# Patient Record
Sex: FEMALE | Race: WHITE
[De-identification: names, ages, dates, MRNs, and addresses within clinical notes are randomized per-mention and may not be internally consistent; named-entity substitution may affect disease eponyms.]

---

## 2022-05-19 ENCOUNTER — HOSPITAL ENCOUNTER (INPATIENT)
Dept: HOSPITAL 12 - ER | Age: 82
LOS: 15 days | Discharge: NURSING, REHAB OR LONG-TERM CARE | DRG: 885 | End: 2022-06-03
Attending: INTERNAL MEDICINE
Payer: MEDICARE

## 2022-05-19 VITALS — HEIGHT: 63 IN | WEIGHT: 147 LBS | BODY MASS INDEX: 26.05 KG/M2

## 2022-05-19 VITALS — SYSTOLIC BLOOD PRESSURE: 132 MMHG | DIASTOLIC BLOOD PRESSURE: 72 MMHG

## 2022-05-19 VITALS — SYSTOLIC BLOOD PRESSURE: 129 MMHG | DIASTOLIC BLOOD PRESSURE: 54 MMHG

## 2022-05-19 VITALS — SYSTOLIC BLOOD PRESSURE: 122 MMHG | DIASTOLIC BLOOD PRESSURE: 59 MMHG

## 2022-05-19 DIAGNOSIS — F31.9: ICD-10-CM

## 2022-05-19 DIAGNOSIS — F03.91: ICD-10-CM

## 2022-05-19 DIAGNOSIS — F29: Primary | ICD-10-CM

## 2022-05-19 DIAGNOSIS — Z20.822: ICD-10-CM

## 2022-05-19 DIAGNOSIS — I10: ICD-10-CM

## 2022-05-19 DIAGNOSIS — D68.59: ICD-10-CM

## 2022-05-19 DIAGNOSIS — E78.5: ICD-10-CM

## 2022-05-19 DIAGNOSIS — G89.4: ICD-10-CM

## 2022-05-19 DIAGNOSIS — F20.9: ICD-10-CM

## 2022-05-19 DIAGNOSIS — E03.9: ICD-10-CM

## 2022-05-19 DIAGNOSIS — F39: ICD-10-CM

## 2022-05-19 DIAGNOSIS — M19.90: ICD-10-CM

## 2022-05-19 DIAGNOSIS — Z74.09: ICD-10-CM

## 2022-05-19 DIAGNOSIS — B96.89: ICD-10-CM

## 2022-05-19 DIAGNOSIS — N39.0: ICD-10-CM

## 2022-05-19 LAB
ALP SERPL-CCNC: 90 U/L (ref 50–136)
ALT SERPL W/O P-5'-P-CCNC: 29 U/L (ref 14–59)
AMPHETAMINES UR QL SCN>1000 NG/ML: NEGATIVE
APAP SERPL-MCNC: < 2 UG/ML (ref 10–30)
APPEARANCE UR: (no result)
AST SERPL-CCNC: 27 U/L (ref 15–37)
BILIRUB DIRECT SERPL-MCNC: 0.1 MG/DL (ref 0–0.2)
BILIRUB SERPL-MCNC: 0.3 MG/DL (ref 0.2–1)
BILIRUB UR QL STRIP: NEGATIVE
BUN SERPL-MCNC: 17 MG/DL (ref 7–18)
CHLORIDE SERPL-SCNC: 104 MMOL/L (ref 98–107)
CK SERPL-CCNC: 398 U/L (ref 26–192)
CO2 SERPL-SCNC: 28 MMOL/L (ref 21–32)
COCAINE UR QL SCN: NEGATIVE
COLOR UR: YELLOW
CREAT SERPL-MCNC: 0.7 MG/DL (ref 0.6–1.3)
DEPRECATED SQUAMOUS URNS QL MICRO: (no result) /HPF
ETHANOL SERPL-MCNC: < 3 MG/DL (ref 0–0)
GLUCOSE SERPL-MCNC: 98 MG/DL (ref 74–106)
GLUCOSE UR STRIP-MCNC: NEGATIVE MG/DL
HCT VFR BLD AUTO: 37.1 % (ref 31.2–41.9)
HGB UR QL STRIP: NEGATIVE
KETONES UR STRIP-MCNC: NEGATIVE MG/DL
LEUKOCYTE ESTERASE UR QL STRIP: (no result)
MCH RBC QN AUTO: 29.1 UUG (ref 24.7–32.8)
MCV RBC AUTO: 85.1 FL (ref 75.5–95.3)
NITRITE UR QL STRIP: NEGATIVE
OPIATES UR QL SCN: NEGATIVE
PCP UR QL SCN>25 NG/ML: NEGATIVE
PH UR STRIP: 6 [PH] (ref 5–8)
PLATELET # BLD AUTO: 186 K/UL (ref 179–408)
POTASSIUM SERPL-SCNC: 3.6 MMOL/L (ref 3.5–5.1)
RBC #/AREA URNS HPF: (no result) /HPF (ref 0–3)
SP GR UR STRIP: 1.02 (ref 1–1.03)
THC UR QL SCN>50 NG/ML: NEGATIVE
TSH SERPL DL<=0.005 MIU/L-ACNC: 8.37 MIU/ML (ref 0.36–3.74)
UROBILINOGEN UR STRIP-MCNC: 0.2 E.U./DL
WBC #/AREA URNS HPF: (no result) /HPF
WBC #/AREA URNS HPF: (no result) /HPF (ref 0–3)
WS STN SPEC: 7.1 G/DL (ref 6.4–8.2)
YEAST URNS QL MICRO: (no result) /HPF

## 2022-05-19 PROCEDURE — A4663 DIALYSIS BLOOD PRESSURE CUFF: HCPCS

## 2022-05-19 PROCEDURE — G0480 DRUG TEST DEF 1-7 CLASSES: HCPCS

## 2022-05-19 RX ADMIN — LORAZEPAM PRN MG: 1 TABLET ORAL at 20:37

## 2022-05-19 RX ADMIN — DIVALPROEX SODIUM SCH MG: 250 TABLET, DELAYED RELEASE ORAL at 20:36

## 2022-05-19 RX ADMIN — Medication SCH MG: at 17:32

## 2022-05-19 RX ADMIN — ACETAMINOPHEN PRN MG: 325 TABLET ORAL at 14:42

## 2022-05-19 RX ADMIN — Medication SCH ML: at 17:39

## 2022-05-19 NOTE — NUR
Gps/Lvn- Tries to call [facility]. couple of times, to obtain information about 
vaccines/immunizations ,  unable to get through,  message kept saying  try again at a later 
time. Called  Kirsty (daughter ) claimed  facility has the record.

## 2022-05-19 NOTE — NUR
Gps/Lvn- Assisted with pm shower ,  patient verbalized headache resolved. Redirectable , 
ambulates around , encouraged  to attend his group tx. Cooperative with staff, noted 
extremely [place] , does not have hearing aid now ,per daughter Kirsty she does not know what 
happened to it .

## 2022-05-19 NOTE — NUR
Received pt, sleeping on bed easily aroused by minimal stimuli. Patient well 
behaved at the moment, no untoward signs of behavior observed. Vitals stable. 
Waiting for MD to medically clear the pt.

## 2022-05-19 NOTE — NUR
ANY Initial Discharge Note:



Pt currently resides at [address] [place] [address] with a part-time 
caretaker. ANY will contact pt's daughter, Kirsty 

([phone]) to gather further information regarding the discharge plan. ANY will work with 
the pt, daughter and MD to ensure a safe and proper 

discharge plan.

## 2022-05-19 NOTE — NUR
Patient awake transferred to [facility] via stretcher with vitals taken as follows; 
BP-125/80 MI-70 RR-18 T-97.3 SPO2-98% RA PA-0/10.

## 2022-05-19 NOTE — NUR
Gps/Lvn- Was able to get hold of patient's daughter Kirsty ( [phone]), claimed she's 
worried about her mother's situation , verbalized she's being nervous of what is going on, 
wanting to talk to the Medical Doctor as well as Dr Carvalho, informed will notify Doctors  
and inform her request. Per Kirsty patient used to have apartment of her own, and had 
Caregiver part time , helps her with her groceries and Doctors appointment. Awol risk . Per 
Daughter she believes , patient received Moderna vaccine with booster but not sure exactly 
w/c date , informed will call SNF to double check the right date received.

## 2022-05-19 NOTE — NUR
Report from ER received by Nilay Clemons RN  from [facility] .. Admitted via wheel chair, in no 
sign of any distress, hesitancy answering questions, needed to repeat questions couple of 
time. Confused, disoriented, she thinks she's in someone else house, year 1910 or 1920 per 
patient, was able to answer [place] is the President.Patient was fed in ER prior to coming to 
M[facility]. Skin very dry, intact, tenting. Unable to provide more information asked, will refer to 
previous record from Facility . Does not want to be bothered, wants her overhead light be 
turned off. Routine  admission care .

## 2022-05-19 NOTE — NUR
Gps/Lvn- Stayed in th dinning room during dinner, interacting fairly well with her peers. 
Patient noted some word finding difficulty when conversing with staff , words does not makes 
sense , confused, disoriented, reminded she's in the Hospital. Safety reviewed

## 2022-05-20 VITALS — DIASTOLIC BLOOD PRESSURE: 73 MMHG | SYSTOLIC BLOOD PRESSURE: 160 MMHG

## 2022-05-20 VITALS — SYSTOLIC BLOOD PRESSURE: 121 MMHG | DIASTOLIC BLOOD PRESSURE: 69 MMHG

## 2022-05-20 RX ADMIN — POLYETHYLENE GLYCOL 3350 SCH GM: 17 POWDER, FOR SOLUTION ORAL at 09:00

## 2022-05-20 RX ADMIN — Medication SCH MG: at 08:58

## 2022-05-20 RX ADMIN — DIVALPROEX SODIUM SCH MG: 250 TABLET, DELAYED RELEASE ORAL at 20:12

## 2022-05-20 RX ADMIN — LORAZEPAM PRN MG: 1 TABLET ORAL at 15:20

## 2022-05-20 RX ADMIN — DIVALPROEX SODIUM SCH MG: 250 TABLET, DELAYED RELEASE ORAL at 08:55

## 2022-05-20 RX ADMIN — ESCITALOPRAM OXALATE SCH MG: 10 TABLET, FILM COATED ORAL at 08:57

## 2022-05-20 RX ADMIN — Medication SCH MG: at 17:43

## 2022-05-20 RX ADMIN — AMLODIPINE BESYLATE SCH MG: 5 TABLET ORAL at 09:00

## 2022-05-20 RX ADMIN — Medication SCH TAB: at 08:57

## 2022-05-20 RX ADMIN — Medication SCH ML: at 17:44

## 2022-05-20 RX ADMIN — LORAZEPAM PRN MG: 1 TABLET ORAL at 20:12

## 2022-05-20 RX ADMIN — Medication SCH MG: at 08:55

## 2022-05-20 RX ADMIN — Medication SCH ML: at 09:01

## 2022-05-20 NOTE — NUR
Tried to call [facility] but voice message, not accepting calls. Double 
checked number on intranet. Still unable to obtain Vaccination information at this time.

## 2022-05-20 NOTE — NUR
Firearms Report:



 completed and submitted a [employer] firearms report for 5150 for danger to others 
and grave disability certifications. A copy of report has been placed in patient chart.

## 2022-05-20 NOTE — NUR
Gps/Lvn- Kept up in her elsie-chair , kept trying to slide from elsie-chair, put patient in 
front of the Nurses Station for close supervision for safety

## 2022-05-20 NOTE — NUR
Gps/Lvn-  Salome was able to get hold of the [facility] , Mark , was able 
to give us Nurses Station information obtained patient received Moderna vaccine first does 
on 3/22/2021, second dose on 4/13/2021, booster [place] on 12/21/21 , no records of Pneumonia 
vaccine , nor Flu vaccine noted.
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
## 2022-05-21 VITALS — DIASTOLIC BLOOD PRESSURE: 75 MMHG | SYSTOLIC BLOOD PRESSURE: 145 MMHG

## 2022-05-21 VITALS — DIASTOLIC BLOOD PRESSURE: 68 MMHG | SYSTOLIC BLOOD PRESSURE: 149 MMHG

## 2022-05-21 VITALS — DIASTOLIC BLOOD PRESSURE: 70 MMHG | SYSTOLIC BLOOD PRESSURE: 147 MMHG

## 2022-05-21 RX ADMIN — Medication SCH MG: at 16:32

## 2022-05-21 RX ADMIN — Medication SCH MG: at 08:21

## 2022-05-21 RX ADMIN — LORAZEPAM PRN MG: 1 TABLET ORAL at 21:17

## 2022-05-21 RX ADMIN — Medication SCH ML: at 16:46

## 2022-05-21 RX ADMIN — Medication SCH TAB: at 08:21

## 2022-05-21 RX ADMIN — POLYETHYLENE GLYCOL 3350 SCH GM: 17 POWDER, FOR SOLUTION ORAL at 08:21

## 2022-05-21 RX ADMIN — ESCITALOPRAM OXALATE SCH MG: 10 TABLET, FILM COATED ORAL at 08:12

## 2022-05-21 RX ADMIN — DIVALPROEX SODIUM SCH MG: 250 TABLET, DELAYED RELEASE ORAL at 21:17

## 2022-05-21 RX ADMIN — NAPROXEN PRN MG: 500 TABLET ORAL at 21:17

## 2022-05-21 RX ADMIN — Medication SCH MG: at 08:12

## 2022-05-21 RX ADMIN — LORAZEPAM PRN MG: 1 TABLET ORAL at 12:43

## 2022-05-21 RX ADMIN — DIVALPROEX SODIUM SCH MG: 250 TABLET, DELAYED RELEASE ORAL at 08:11

## 2022-05-21 RX ADMIN — AMLODIPINE BESYLATE SCH MG: 5 TABLET ORAL at 08:12

## 2022-05-21 RX ADMIN — Medication SCH ML: at 09:00

## 2022-05-21 NOTE — NUR
GPS: 14 DAY HOLD 6329 FAXED TO THE [facility] FOR GROUNDS OF DTO AND GD. PT GIVEN A COPY OF THE 
HOLD.

## 2022-05-21 NOTE — NUR
Gps/Lvn- Kept wandering around , going room ro room, needing constant redirections, pushing 
doors, AWOL risk , continue to monitor safety, needs. Speech incoherent, confused., [place]

## 2022-05-22 VITALS — DIASTOLIC BLOOD PRESSURE: 64 MMHG | SYSTOLIC BLOOD PRESSURE: 134 MMHG

## 2022-05-22 VITALS — DIASTOLIC BLOOD PRESSURE: 58 MMHG | SYSTOLIC BLOOD PRESSURE: 114 MMHG

## 2022-05-22 VITALS — DIASTOLIC BLOOD PRESSURE: 67 MMHG | SYSTOLIC BLOOD PRESSURE: 146 MMHG

## 2022-05-22 RX ADMIN — Medication SCH TAB: at 09:01

## 2022-05-22 RX ADMIN — POLYETHYLENE GLYCOL 3350 SCH GM: 17 POWDER, FOR SOLUTION ORAL at 09:02

## 2022-05-22 RX ADMIN — Medication SCH MG: at 09:03

## 2022-05-22 RX ADMIN — AMLODIPINE BESYLATE SCH MG: 5 TABLET ORAL at 09:02

## 2022-05-22 RX ADMIN — Medication SCH ML: at 16:30

## 2022-05-22 RX ADMIN — LORAZEPAM PRN MG: 1 TABLET ORAL at 20:44

## 2022-05-22 RX ADMIN — ESCITALOPRAM OXALATE SCH MG: 10 TABLET, FILM COATED ORAL at 09:00

## 2022-05-22 RX ADMIN — DIVALPROEX SODIUM SCH MG: 250 TABLET, DELAYED RELEASE ORAL at 20:44

## 2022-05-22 RX ADMIN — DIVALPROEX SODIUM SCH MG: 250 TABLET, DELAYED RELEASE ORAL at 09:00

## 2022-05-22 RX ADMIN — Medication SCH ML: at 09:03

## 2022-05-22 RX ADMIN — Medication SCH MG: at 16:26

## 2022-05-22 NOTE — NUR
Patient was found in another patients room at the start of the shift. Calm but confused. 
Easily redirectable. This writer was unable to engage in any meaningful conversation d/t 
patient being delusional and paranoid. Safety Stratiges in place. Continuing to reorient and 
redirect patient as needed. No aggression noted this shift. Patient is medication compliant.

## 2022-05-22 NOTE — NUR
Gps/Lvn- Remains figity, kept fixing her bed, no hallucinations noted, noted kept pushing , 
checking doors doors . Confused, disoriented, incoherent speech , constantly needing 
redirections .

## 2022-05-23 VITALS — SYSTOLIC BLOOD PRESSURE: 118 MMHG | DIASTOLIC BLOOD PRESSURE: 64 MMHG

## 2022-05-23 VITALS — DIASTOLIC BLOOD PRESSURE: 62 MMHG | SYSTOLIC BLOOD PRESSURE: 114 MMHG

## 2022-05-23 VITALS — DIASTOLIC BLOOD PRESSURE: 69 MMHG | SYSTOLIC BLOOD PRESSURE: 128 MMHG

## 2022-05-23 RX ADMIN — Medication SCH MG: at 08:35

## 2022-05-23 RX ADMIN — Medication SCH ML: at 17:36

## 2022-05-23 RX ADMIN — AMLODIPINE BESYLATE SCH MG: 5 TABLET ORAL at 08:36

## 2022-05-23 RX ADMIN — DIVALPROEX SODIUM SCH MG: 250 TABLET, DELAYED RELEASE ORAL at 20:49

## 2022-05-23 RX ADMIN — Medication SCH ML: at 08:38

## 2022-05-23 RX ADMIN — ESCITALOPRAM OXALATE SCH MG: 10 TABLET, FILM COATED ORAL at 08:36

## 2022-05-23 RX ADMIN — ACETAMINOPHEN PRN MG: 325 TABLET ORAL at 12:08

## 2022-05-23 RX ADMIN — Medication SCH TAB: at 08:36

## 2022-05-23 RX ADMIN — POLYETHYLENE GLYCOL 3350 SCH GM: 17 POWDER, FOR SOLUTION ORAL at 08:37

## 2022-05-23 RX ADMIN — Medication SCH MG: at 17:35

## 2022-05-23 RX ADMIN — LORAZEPAM PRN MG: 1 TABLET ORAL at 22:55

## 2022-05-23 RX ADMIN — LORAZEPAM PRN MG: 1 TABLET ORAL at 12:08

## 2022-05-23 RX ADMIN — DIVALPROEX SODIUM SCH MG: 250 TABLET, DELAYED RELEASE ORAL at 08:35

## 2022-05-23 NOTE — NUR
Patient is confused and is unable to respond to redirection for longer than a minute. Safety 
Stratiges are in place. Fluids encouraged for UTI , and patient is medication compliant. 
Some urinary incontinence noted during the night. Patient slept for 7.00 hours.

## 2022-05-23 NOTE — NUR
received patient  incoherent speech, confused, disoriented, constantly needing to be 
redirected, tends to wander room to room, Fluids offered for UTI, PRN Ativan given as 
ordered,will continue close monitoring.

## 2022-05-24 VITALS — SYSTOLIC BLOOD PRESSURE: 121 MMHG | DIASTOLIC BLOOD PRESSURE: 69 MMHG

## 2022-05-24 VITALS — SYSTOLIC BLOOD PRESSURE: 166 MMHG | DIASTOLIC BLOOD PRESSURE: 80 MMHG

## 2022-05-24 VITALS — SYSTOLIC BLOOD PRESSURE: 115 MMHG | DIASTOLIC BLOOD PRESSURE: 64 MMHG

## 2022-05-24 RX ADMIN — DIVALPROEX SODIUM SCH MG: 250 TABLET, DELAYED RELEASE ORAL at 08:33

## 2022-05-24 RX ADMIN — Medication SCH TAB: at 08:33

## 2022-05-24 RX ADMIN — Medication SCH MG: at 16:59

## 2022-05-24 RX ADMIN — Medication SCH ML: at 17:00

## 2022-05-24 RX ADMIN — DIVALPROEX SODIUM SCH MG: 250 TABLET, DELAYED RELEASE ORAL at 20:10

## 2022-05-24 RX ADMIN — LORAZEPAM PRN MG: 1 TABLET ORAL at 21:17

## 2022-05-24 RX ADMIN — Medication SCH MG: at 08:40

## 2022-05-24 RX ADMIN — AMLODIPINE BESYLATE SCH MG: 5 TABLET ORAL at 08:34

## 2022-05-24 RX ADMIN — Medication SCH MG: at 08:33

## 2022-05-24 RX ADMIN — POLYETHYLENE GLYCOL 3350 SCH GM: 17 POWDER, FOR SOLUTION ORAL at 08:34

## 2022-05-24 RX ADMIN — Medication PRN MG: at 00:07

## 2022-05-24 RX ADMIN — Medication PRN MG: at 23:06

## 2022-05-24 RX ADMIN — Medication SCH ML: at 08:39

## 2022-05-24 NOTE — NUR
Received patient in the hallway. she is noted A/O x 1 (to name only) she is noted pacing the 
hallway, going into other patient's rooms, she is forgetful and hard to redirect. she is 
unable to have a meaningful conversation with this writer. impaired insight and judgment is 
noted as to the reason for her admission to [facility]. Ativan 1mg PO PRN was given for anxiety and 
agitation.  V/S are stable. patient in no distress. PO fluid and snacks were provided. 
Safety and fall precaution are in place. will continue to monitor.

## 2022-05-25 VITALS — SYSTOLIC BLOOD PRESSURE: 157 MMHG | DIASTOLIC BLOOD PRESSURE: 71 MMHG

## 2022-05-25 VITALS — DIASTOLIC BLOOD PRESSURE: 93 MMHG | SYSTOLIC BLOOD PRESSURE: 173 MMHG

## 2022-05-25 VITALS — DIASTOLIC BLOOD PRESSURE: 85 MMHG | SYSTOLIC BLOOD PRESSURE: 109 MMHG

## 2022-05-25 LAB
BUN SERPL-MCNC: 24 MG/DL (ref 7–18)
CHLORIDE SERPL-SCNC: 108 MMOL/L (ref 98–107)
CHOLEST SERPL-MCNC: 152 MG/DL (ref ?–200)
CO2 SERPL-SCNC: 30 MMOL/L (ref 21–32)
CREAT SERPL-MCNC: 0.8 MG/DL (ref 0.6–1.3)
GLUCOSE SERPL-MCNC: 102 MG/DL (ref 74–106)
HCT VFR BLD AUTO: 34.5 % (ref 31.2–41.9)
HDLC SERPL-MCNC: 55 MG/DL (ref 40–60)
MAGNESIUM SERPL-MCNC: 2.1 MG/DL (ref 1.8–2.4)
MCH RBC QN AUTO: 29.5 UUG (ref 24.7–32.8)
MCV RBC AUTO: 86.5 FL (ref 75.5–95.3)
NEUTS SEG NFR BLD MANUAL: 0 % (ref 42–75)
PHOSPHATE SERPL-MCNC: 4.1 MG/DL (ref 2.5–4.9)
PLATELET # BLD AUTO: 148 K/UL (ref 179–408)
POTASSIUM SERPL-SCNC: 3.9 MMOL/L (ref 3.5–5.1)
TRIGL SERPL-MCNC: 63 MG/DL (ref 30–150)

## 2022-05-25 RX ADMIN — Medication SCH MG: at 09:12

## 2022-05-25 RX ADMIN — DIVALPROEX SODIUM SCH MG: 250 TABLET, DELAYED RELEASE ORAL at 12:59

## 2022-05-25 RX ADMIN — DIVALPROEX SODIUM SCH MG: 250 TABLET, DELAYED RELEASE ORAL at 09:12

## 2022-05-25 RX ADMIN — Medication SCH ML: at 18:02

## 2022-05-25 RX ADMIN — DIVALPROEX SODIUM SCH MG: 250 TABLET, DELAYED RELEASE ORAL at 18:02

## 2022-05-25 RX ADMIN — LEVOTHYROXINE SODIUM SCH MCG: 25 TABLET ORAL at 06:52

## 2022-05-25 RX ADMIN — Medication PRN MG: at 22:54

## 2022-05-25 RX ADMIN — DIVALPROEX SODIUM SCH MG: 250 TABLET, DELAYED RELEASE ORAL at 12:01

## 2022-05-25 RX ADMIN — Medication SCH ML: at 12:02

## 2022-05-25 RX ADMIN — AMLODIPINE BESYLATE SCH MG: 5 TABLET ORAL at 09:11

## 2022-05-25 RX ADMIN — Medication SCH MG: at 09:11

## 2022-05-25 RX ADMIN — POLYETHYLENE GLYCOL 3350 SCH GM: 17 POWDER, FOR SOLUTION ORAL at 09:18

## 2022-05-25 RX ADMIN — LORAZEPAM PRN MG: 1 TABLET ORAL at 18:04

## 2022-05-25 RX ADMIN — Medication SCH MG: at 18:04

## 2022-05-25 RX ADMIN — Medication SCH TAB: at 09:12

## 2022-05-25 NOTE — NUR
GPS: PT RECEIVED ON BED TODAY. DENIES ANY PAIN OR DISCOMFORT. PT SEEN WANDERING PACING 
HALLWAY AND VERY CONFUSED, REORIENTED PT TO HER ROOM. REFUSED WEARING DIAPER AND VERY 
RESISTIVE WITH CARE, PT WALKING AT HALLWAY WITH LOOSE BOWEL MOVEMENT. ASSISTED PT TO THE 
BATHROOM. PT UNABLE TO MAKE NEEDS KNOWN. STAFF ABLE TO PUT DIAPER AND PANTS ON PT. COMPLIANT 
WITH MEDS. NO AGITATION NOTED.

## 2022-05-25 NOTE — NUR
GPS: PT HAD BEEN PACING ALONG THE HALLWAY AND ALWAYS STANDING BY ON THE ENTRANCE DOOR. PT IS 
ON AWOL RISK. REDIRECTING PT BUT SOMEWHAT DIFFICULT. PT WANDERS ON THE ROOM. RE-ORIENTED PT 
TO HER ROOM BUT SEEMS TO FORGET IT EASILY. PT FEELS ANXIOUS AND STATING WANTING TO LEAVE THE 
PLACE, ASKING STAFF TO OPEN THE FRONT DOOR FOR HER. INTRUSIVE WITH STAFF. HAVE FLIGHT OF 
IDEAS, JUMPING FROM ONE SUBJECT TO ANOTHER. PT WAS GIVEN ATIVAN FOR ANXIETY. NO AGITATION 
NOTED AT THIS TIME. WILL MONITOR FOR SAFETY.

## 2022-05-26 VITALS — DIASTOLIC BLOOD PRESSURE: 58 MMHG | SYSTOLIC BLOOD PRESSURE: 124 MMHG

## 2022-05-26 VITALS — SYSTOLIC BLOOD PRESSURE: 149 MMHG | DIASTOLIC BLOOD PRESSURE: 75 MMHG

## 2022-05-26 VITALS — DIASTOLIC BLOOD PRESSURE: 76 MMHG | SYSTOLIC BLOOD PRESSURE: 145 MMHG

## 2022-05-26 RX ADMIN — Medication SCH ML: at 09:00

## 2022-05-26 RX ADMIN — DIVALPROEX SODIUM SCH MG: 250 TABLET, DELAYED RELEASE ORAL at 10:15

## 2022-05-26 RX ADMIN — Medication SCH TAB: at 10:18

## 2022-05-26 RX ADMIN — Medication SCH ML: at 17:04

## 2022-05-26 RX ADMIN — AMLODIPINE BESYLATE SCH MG: 5 TABLET ORAL at 10:14

## 2022-05-26 RX ADMIN — LEVOTHYROXINE SODIUM SCH MCG: 25 TABLET ORAL at 06:47

## 2022-05-26 RX ADMIN — Medication PRN MG: at 21:48

## 2022-05-26 RX ADMIN — POLYETHYLENE GLYCOL 3350 SCH GM: 17 POWDER, FOR SOLUTION ORAL at 09:00

## 2022-05-26 RX ADMIN — NAPROXEN PRN MG: 500 TABLET ORAL at 21:18

## 2022-05-26 RX ADMIN — Medication SCH MG: at 10:13

## 2022-05-26 RX ADMIN — LORAZEPAM PRN MG: 1 TABLET ORAL at 20:33

## 2022-05-26 RX ADMIN — DIVALPROEX SODIUM SCH MG: 250 TABLET, DELAYED RELEASE ORAL at 16:55

## 2022-05-26 RX ADMIN — DIVALPROEX SODIUM SCH MG: 250 TABLET, DELAYED RELEASE ORAL at 12:35

## 2022-05-26 NOTE — NUR
Gps/Donovann- Amira Zapata NP was called,  to informed patient had a fall this am. , texted, left 
message.

## 2022-05-26 NOTE — NUR
Gps/Lvn- Apparently per monitor ,  as reviewed   patient dint have any falls this am. 
Patient tends to sit on the floor, leans on the walls.  kept patient infront of the Nurses 
station during  lunch

## 2022-05-26 NOTE — NUR
Gps/Lvn- Found patient on the floor, in front  of the Social Workers's  office , lying on 
her left side. Patient does not know what happened, confused, disoriented, , incoherent 
speech. Denies  any pain, no discomfort at this time. B/P 126/54- HR 78, resp. 18, 02 sat 
95% . Patient was assisted to elsie-chair, kept up by the Nurses Station , monitored closely 
for safety .

## 2022-05-27 VITALS — SYSTOLIC BLOOD PRESSURE: 154 MMHG | DIASTOLIC BLOOD PRESSURE: 73 MMHG

## 2022-05-27 VITALS — DIASTOLIC BLOOD PRESSURE: 76 MMHG | SYSTOLIC BLOOD PRESSURE: 131 MMHG

## 2022-05-27 RX ADMIN — Medication SCH MG: at 08:26

## 2022-05-27 RX ADMIN — Medication SCH ML: at 08:34

## 2022-05-27 RX ADMIN — DIVALPROEX SODIUM SCH MG: 250 TABLET, DELAYED RELEASE ORAL at 13:18

## 2022-05-27 RX ADMIN — Medication SCH TAB: at 08:26

## 2022-05-27 RX ADMIN — POLYETHYLENE GLYCOL 3350 SCH GM: 17 POWDER, FOR SOLUTION ORAL at 08:28

## 2022-05-27 RX ADMIN — DIVALPROEX SODIUM SCH MG: 250 TABLET, DELAYED RELEASE ORAL at 08:26

## 2022-05-27 RX ADMIN — AMLODIPINE BESYLATE SCH MG: 5 TABLET ORAL at 08:26

## 2022-05-27 RX ADMIN — LORAZEPAM PRN MG: 1 TABLET ORAL at 20:22

## 2022-05-27 RX ADMIN — Medication SCH ML: at 16:56

## 2022-05-27 RX ADMIN — DIVALPROEX SODIUM SCH MG: 250 TABLET, DELAYED RELEASE ORAL at 16:56

## 2022-05-27 RX ADMIN — Medication PRN MG: at 22:31

## 2022-05-27 RX ADMIN — LEVOTHYROXINE SODIUM SCH MCG: 25 TABLET ORAL at 06:09

## 2022-05-27 NOTE — NUR
RECEIVED PATIENT IN THE HALLWAY SITTING IN A JOSE MANUEL CHAIR FOR SAFETY. SHE IS NOTED A/O X 1. 
HYPERVERBAL, RESTLESS, AND ANXIOUS. PATIENT IS UNABLE TO HAVE A MEANINGFUL CONVERSATION WITH 
THIS WRITER. SHE NEEDS CONSTANT REDIRECTION AND REALITY ORIENTATION. NO AGGRESSIVE OR 
COMBATIVE BX NOTED AT THIS TIME. ATIVAN 1MG PO PRN WAS GIVEN. FOR ANXIETY. V/S STABLE. PT IN 
NO DISTRESS. SHE WAS GIVEN PO FLUIDS AND SNACKS. SAFETY AND FALL PRECAUTION ARE IN PLACE. 
SHE IS REASSURED FOR HER SAFETY. WILL CONTINUE TO MONITOR Q15 MIN CHECKS AS PER UNIT 
PROTOCOL.

## 2022-05-27 NOTE — NUR
Received patient in the Karyn chair at the start of the shift. Agitated and confused. The 
patient was also anxious. This writer reoriented and reassured the patient many times, gave 
patient medication for anxiety with some snack and helped her with a shower. Ambulated 
patient up and down the pugh a few times, using a front wheel walker, then the patient went 
to bed. Bed alarm is on and frequent rounding done to ensure safety. No changes noted.

## 2022-05-27 NOTE — NUR
Gps/Lvn-Bed alarm on when pt. in bed, noted gait slightly unsteady, tends to lean on the 
walls when walking , Potential for fall. Remains confused, speech  incoherent.No aggressive 
behavior needed assist in toileting , diaper on . Complaint with her routine meds.  Fluid 
intake adequate

## 2022-05-28 VITALS — DIASTOLIC BLOOD PRESSURE: 73 MMHG | SYSTOLIC BLOOD PRESSURE: 141 MMHG

## 2022-05-28 VITALS — SYSTOLIC BLOOD PRESSURE: 147 MMHG | DIASTOLIC BLOOD PRESSURE: 72 MMHG

## 2022-05-28 VITALS — DIASTOLIC BLOOD PRESSURE: 71 MMHG | SYSTOLIC BLOOD PRESSURE: 134 MMHG

## 2022-05-28 RX ADMIN — Medication SCH ML: at 09:09

## 2022-05-28 RX ADMIN — Medication SCH TAB: at 09:05

## 2022-05-28 RX ADMIN — Medication SCH ML: at 16:45

## 2022-05-28 RX ADMIN — POLYETHYLENE GLYCOL 3350 SCH GM: 17 POWDER, FOR SOLUTION ORAL at 09:08

## 2022-05-28 RX ADMIN — DIVALPROEX SODIUM SCH MG: 250 TABLET, DELAYED RELEASE ORAL at 16:45

## 2022-05-28 RX ADMIN — DIVALPROEX SODIUM SCH MG: 250 TABLET, DELAYED RELEASE ORAL at 09:05

## 2022-05-28 RX ADMIN — NAPROXEN PRN MG: 500 TABLET ORAL at 16:45

## 2022-05-28 RX ADMIN — ACETAMINOPHEN PRN MG: 325 TABLET ORAL at 12:47

## 2022-05-28 RX ADMIN — Medication SCH MG: at 09:05

## 2022-05-28 RX ADMIN — AMLODIPINE BESYLATE SCH MG: 5 TABLET ORAL at 09:08

## 2022-05-28 RX ADMIN — DIVALPROEX SODIUM SCH MG: 250 TABLET, DELAYED RELEASE ORAL at 12:47

## 2022-05-28 RX ADMIN — LEVOTHYROXINE SODIUM SCH MCG: 25 TABLET ORAL at 06:24

## 2022-05-28 RX ADMIN — Medication PRN MG: at 21:21

## 2022-05-28 NOTE — NUR
RECEIVED PATIENT IN THE HALLWAY SITTING IN A JOSE MANUEL CHAIR FOR SAFETY. SHE IS NOTED A/O X 1 
WITH NO CHANGES IN LOC. SHE IS HYPERVERBAL. SHE IS UNABLE TO HAVE A MEANINGFUL CONVERSATION 
WITH THIS WRITER. SHE NEEDS CONSTANT REDIRECTION AND REALITY ORIENTATION. SHE NEEDS 
ASSISTANCE WITH ADLS. NO AGGRESSIVE OR COMBATIVE BX NOTED AT THIS TIME. V/S STABLE. PT IN NO 
DISTRESS. SHE WAS GIVEN PO FLUIDS AND SNACKS. SAFETY AND FALL PRECAUTION ARE IN PLACE. SHE 
IS REASSURED FOR HER SAFETY. WILL CONTINUE TO MONITOR Q15 MIN CHECKS AS PER UNIT PROTOCOL.

## 2022-05-28 NOTE — NUR
GPS: Nursing Notes: Thought Disorder:

Patient is awake and responding to her name, disoriented, confused, disorganized, impaired 
judgment, poor insight, believes that his dad is here, believes that something happened to 
him, redirected and reoriented during shift, unable to formulate a viable plan for self 
care, unkempt appearance, unkempt appearance, AWOL risk, episodes of asking to go home, 
unsteady gait, fall risk, disorganized, resistant with nursing care, continue with treatment 
plan.

## 2022-05-29 VITALS — SYSTOLIC BLOOD PRESSURE: 151 MMHG | DIASTOLIC BLOOD PRESSURE: 67 MMHG

## 2022-05-29 VITALS — SYSTOLIC BLOOD PRESSURE: 160 MMHG | DIASTOLIC BLOOD PRESSURE: 73 MMHG

## 2022-05-29 RX ADMIN — DIVALPROEX SODIUM SCH MG: 250 TABLET, DELAYED RELEASE ORAL at 16:50

## 2022-05-29 RX ADMIN — ACETAMINOPHEN PRN MG: 325 TABLET ORAL at 13:10

## 2022-05-29 RX ADMIN — NAPROXEN PRN MG: 500 TABLET ORAL at 09:43

## 2022-05-29 RX ADMIN — Medication SCH MG: at 08:32

## 2022-05-29 RX ADMIN — Medication PRN MG: at 21:48

## 2022-05-29 RX ADMIN — DIVALPROEX SODIUM SCH MG: 250 TABLET, DELAYED RELEASE ORAL at 08:32

## 2022-05-29 RX ADMIN — LORAZEPAM PRN MG: 1 TABLET ORAL at 15:38

## 2022-05-29 RX ADMIN — Medication SCH ML: at 08:33

## 2022-05-29 RX ADMIN — POLYETHYLENE GLYCOL 3350 SCH GM: 17 POWDER, FOR SOLUTION ORAL at 08:32

## 2022-05-29 RX ADMIN — DIVALPROEX SODIUM SCH MG: 250 TABLET, DELAYED RELEASE ORAL at 13:10

## 2022-05-29 RX ADMIN — AMLODIPINE BESYLATE SCH MG: 5 TABLET ORAL at 08:32

## 2022-05-29 RX ADMIN — Medication SCH TAB: at 08:32

## 2022-05-29 RX ADMIN — Medication SCH ML: at 16:51

## 2022-05-29 RX ADMIN — AMLODIPINE BESYLATE SCH MG: 5 TABLET ORAL at 15:37

## 2022-05-29 RX ADMIN — LEVOTHYROXINE SODIUM SCH MCG: 25 TABLET ORAL at 06:30

## 2022-05-29 NOTE — NUR
GPS: Nursing Notes: Thought Disorder:

Patient is awake and responding to her name, cooperative with nursing care, compliant with 
her medications, impaired judgment, confused, disoriented, disorganized, poor insight, 
hyperverbal, talking incoherently, unable to formulate a viable plan for self care, continue 
to monitor for safety, continue with treatment plan.

## 2022-05-29 NOTE — NUR
PATIENT SLEPT FOR APPROX 7.30 HRS THROUGH THE NIGHT. SHE WAS TAKEN TO THE TOILET WHERE SHE 
WAS ABLE TO VOID. NO AGGRESSIVE OR COMBATIVE BX WAS NOTED. SHE CONTINUE COMPLIANT WITH 
MEDICATION REGIMENT DIET AND CARE.

## 2022-05-29 NOTE — NUR
GPS: Nursing Notes: Hep. C Lab. Result:

Staff informed Amira Zapata NP of abnormal hep. C lab. result from 5/25/22, no further orders 
were given, continue to monitor for safety, continue with treatment plan.

## 2022-05-30 VITALS — DIASTOLIC BLOOD PRESSURE: 69 MMHG | SYSTOLIC BLOOD PRESSURE: 130 MMHG

## 2022-05-30 VITALS — SYSTOLIC BLOOD PRESSURE: 158 MMHG | DIASTOLIC BLOOD PRESSURE: 77 MMHG

## 2022-05-30 RX ADMIN — LEVOTHYROXINE SODIUM SCH MCG: 25 TABLET ORAL at 06:35

## 2022-05-30 RX ADMIN — DIVALPROEX SODIUM SCH MG: 250 TABLET, DELAYED RELEASE ORAL at 12:37

## 2022-05-30 RX ADMIN — Medication SCH ML: at 09:40

## 2022-05-30 RX ADMIN — POLYETHYLENE GLYCOL 3350 SCH GM: 17 POWDER, FOR SOLUTION ORAL at 09:39

## 2022-05-30 RX ADMIN — NAPROXEN PRN MG: 500 TABLET ORAL at 09:39

## 2022-05-30 RX ADMIN — AMLODIPINE BESYLATE SCH MG: 5 TABLET ORAL at 09:39

## 2022-05-30 RX ADMIN — NAPROXEN PRN MG: 500 TABLET ORAL at 17:40

## 2022-05-30 RX ADMIN — Medication SCH ML: at 17:40

## 2022-05-30 RX ADMIN — ACETAMINOPHEN PRN MG: 325 TABLET ORAL at 12:37

## 2022-05-30 RX ADMIN — DIVALPROEX SODIUM SCH MG: 250 TABLET, DELAYED RELEASE ORAL at 17:40

## 2022-05-30 RX ADMIN — Medication SCH TAB: at 09:39

## 2022-05-30 RX ADMIN — Medication SCH MG: at 09:39

## 2022-05-30 RX ADMIN — DIVALPROEX SODIUM SCH MG: 250 TABLET, DELAYED RELEASE ORAL at 09:39

## 2022-05-30 NOTE — NUR
RECEIVED PATIENT IN THE HALLWAY SITTING IN A JOSE MANUEL CHAIR FOR SAFETY. SHE IS NOTED A/O X 1 
WITH NO CHANGES IN LOC. SHE IS CALM AND PLEASANT UPON APPROACHED. PT IS A POOR HISTORIAN, 
SHE NEEDS ASSISTANCE WITH ADLS. V/S STABLE. PT IN NO DISTRESS. SHE WAS GIVEN PO FLUIDS AND 
SNACKS. SAFETY AND FALL PRECAUTION ARE IN PLACE. SHE IS REASSURED FOR HER SAFETY. WILL 
CONTINUE TO MONITOR Q15 MIN CHECKS AS PER UNIT PROTOCOL.

## 2022-05-30 NOTE — NUR
GPS: Nursing Notes: Thought Disorder:

Patient is awake and responding to her name, hyperverbal at times, disoriented, 
disorganized, impaired judgment, episodes of talking incoherently, gets easily anxious and 
disorganized when redirected, unable to formulate a viable plan for self care, present in 
therapeutic groups, but minimal participation, unkempt appearance, no episodes fo violent 
outburst, compliant with nursing care, continue to monitor for safety, continue with 
treatment plan.

## 2022-05-31 VITALS — SYSTOLIC BLOOD PRESSURE: 144 MMHG | DIASTOLIC BLOOD PRESSURE: 77 MMHG

## 2022-05-31 VITALS — DIASTOLIC BLOOD PRESSURE: 79 MMHG | SYSTOLIC BLOOD PRESSURE: 136 MMHG

## 2022-05-31 VITALS — DIASTOLIC BLOOD PRESSURE: 92 MMHG | SYSTOLIC BLOOD PRESSURE: 155 MMHG

## 2022-05-31 RX ADMIN — DIVALPROEX SODIUM SCH MG: 250 TABLET, DELAYED RELEASE ORAL at 09:01

## 2022-05-31 RX ADMIN — Medication SCH ML: at 09:02

## 2022-05-31 RX ADMIN — Medication SCH MG: at 09:01

## 2022-05-31 RX ADMIN — DIVALPROEX SODIUM SCH MG: 250 TABLET, DELAYED RELEASE ORAL at 18:03

## 2022-05-31 RX ADMIN — LEVOTHYROXINE SODIUM SCH MCG: 25 TABLET ORAL at 06:19

## 2022-05-31 RX ADMIN — NAPROXEN PRN MG: 500 TABLET ORAL at 18:03

## 2022-05-31 RX ADMIN — AMLODIPINE BESYLATE SCH MG: 5 TABLET ORAL at 09:02

## 2022-05-31 RX ADMIN — ACETAMINOPHEN PRN MG: 325 TABLET ORAL at 13:16

## 2022-05-31 RX ADMIN — POLYETHYLENE GLYCOL 3350 SCH GM: 17 POWDER, FOR SOLUTION ORAL at 09:01

## 2022-05-31 RX ADMIN — DIVALPROEX SODIUM SCH MG: 250 TABLET, DELAYED RELEASE ORAL at 13:15

## 2022-05-31 RX ADMIN — NAPROXEN PRN MG: 500 TABLET ORAL at 09:01

## 2022-05-31 RX ADMIN — Medication SCH ML: at 18:04

## 2022-05-31 RX ADMIN — Medication SCH ML: at 09:06

## 2022-05-31 RX ADMIN — LORAZEPAM PRN MG: 1 TABLET ORAL at 04:02

## 2022-05-31 RX ADMIN — Medication SCH TAB: at 09:01

## 2022-05-31 NOTE — NUR
GPS: Nursing Notes: Thought Disorder:

Patient is awake and responding to her name, cooperative with nursing care, disorganized, 
disoriented, impaired judgment, grabbing slides of orange and rubbing them on her face and 
arms, believes that she was putting lotion on her body, showered, redirected and reoriented 
during shift, unable to formulate a viable plan for self care, continue to monitor for 
safety, continue with treatment plan.

## 2022-06-01 VITALS — DIASTOLIC BLOOD PRESSURE: 68 MMHG | SYSTOLIC BLOOD PRESSURE: 149 MMHG

## 2022-06-01 VITALS — DIASTOLIC BLOOD PRESSURE: 81 MMHG | SYSTOLIC BLOOD PRESSURE: 125 MMHG

## 2022-06-01 VITALS — SYSTOLIC BLOOD PRESSURE: 150 MMHG | DIASTOLIC BLOOD PRESSURE: 81 MMHG

## 2022-06-01 RX ADMIN — Medication SCH MG: at 08:28

## 2022-06-01 RX ADMIN — Medication SCH TAB: at 08:28

## 2022-06-01 RX ADMIN — DIVALPROEX SODIUM SCH MG: 250 TABLET, DELAYED RELEASE ORAL at 08:28

## 2022-06-01 RX ADMIN — LEVOTHYROXINE SODIUM SCH MCG: 25 TABLET ORAL at 06:03

## 2022-06-01 RX ADMIN — Medication SCH ML: at 08:29

## 2022-06-01 RX ADMIN — Medication SCH ML: at 17:18

## 2022-06-01 RX ADMIN — Medication SCH ML: at 09:03

## 2022-06-01 RX ADMIN — POLYETHYLENE GLYCOL 3350 SCH GM: 17 POWDER, FOR SOLUTION ORAL at 08:29

## 2022-06-01 RX ADMIN — AMLODIPINE BESYLATE SCH MG: 5 TABLET ORAL at 08:28

## 2022-06-01 RX ADMIN — MELATONIN TAB 3 MG SCH MG: 3 TAB at 20:31

## 2022-06-01 RX ADMIN — DIVALPROEX SODIUM SCH MG: 250 TABLET, DELAYED RELEASE ORAL at 12:48

## 2022-06-01 RX ADMIN — DIVALPROEX SODIUM SCH MG: 250 TABLET, DELAYED RELEASE ORAL at 17:14

## 2022-06-01 NOTE — NUR
Received patient awake in the hallway. A/O X 1 - 2 to person. Pt. is forgetful, confused, 
disoriented, cooperative with care, and compliant with medications. Pt. ambulates with 
assistance, unsteady gait. Denies SI/HI AH/VH, SOB, pain or any discomfort. Pt. is continent 
of bladder and bowel. Emotional support provided. Fall and safety precautions implemented.

## 2022-06-02 VITALS — SYSTOLIC BLOOD PRESSURE: 149 MMHG | DIASTOLIC BLOOD PRESSURE: 53 MMHG

## 2022-06-02 VITALS — DIASTOLIC BLOOD PRESSURE: 63 MMHG | SYSTOLIC BLOOD PRESSURE: 142 MMHG

## 2022-06-02 VITALS — SYSTOLIC BLOOD PRESSURE: 115 MMHG | DIASTOLIC BLOOD PRESSURE: 68 MMHG

## 2022-06-02 RX ADMIN — Medication SCH TAB: at 09:08

## 2022-06-02 RX ADMIN — Medication SCH ML: at 09:10

## 2022-06-02 RX ADMIN — Medication SCH MG: at 09:09

## 2022-06-02 RX ADMIN — Medication SCH ML: at 09:09

## 2022-06-02 RX ADMIN — POLYETHYLENE GLYCOL 3350 SCH GM: 17 POWDER, FOR SOLUTION ORAL at 09:09

## 2022-06-02 RX ADMIN — LEVOTHYROXINE SODIUM SCH MCG: 25 TABLET ORAL at 06:06

## 2022-06-02 RX ADMIN — DIVALPROEX SODIUM SCH MG: 250 TABLET, DELAYED RELEASE ORAL at 09:08

## 2022-06-02 RX ADMIN — DIVALPROEX SODIUM SCH MG: 250 TABLET, DELAYED RELEASE ORAL at 12:36

## 2022-06-02 RX ADMIN — DIVALPROEX SODIUM SCH MG: 250 TABLET, DELAYED RELEASE ORAL at 16:34

## 2022-06-02 RX ADMIN — AMLODIPINE BESYLATE SCH MG: 5 TABLET ORAL at 09:10

## 2022-06-02 RX ADMIN — MELATONIN TAB 3 MG SCH MG: 3 TAB at 20:19

## 2022-06-02 RX ADMIN — Medication SCH ML: at 16:35

## 2022-06-02 NOTE — NUR
GPS:REMAIN CALM AND COOPERATIVE. PATIENT SLEPT FOR APPROX 7 HRS THROUGH THE NIGHT. PATIENT 
WAS TAKEN TO THE TOILET WHERE SHE WAS ABLE TO VOID. NO AGGRESSIVE OR COMBATIVE BEHAVIOR 
NOTED.  CONTINUE COMPLIANT WITH MEDICATION REGIMENT DIET AND CARE. NO C/O PAIN OR DISCOMFORT 
AT THIS TIME.

## 2022-06-02 NOTE — NUR
ANY Family Contact:



SW spoke with pt's daughter, Kirsty ([phone]) who agreed that pt should return to 
[facility]. Kirsty appeared emotional over the phone and explained how difficult 
this process is for her as she cannot help her mother alone. Kirsty was very understanding 
and explained her pt's past behaviors. Kirsty stated she is very happy with the pt's MD, Dr. Carvalho and she is aware her mother is in great care. Kirsty wanted to ensure pt will be safe 
at [place] and SW confirmed with Dr. Carvalho that pt will be safe in a secure facility and 
will be safe. Kirsty is aware pt is discharging to [facility] in [place] and she 
is agreeable with the discharge plan.

## 2022-06-03 VITALS — DIASTOLIC BLOOD PRESSURE: 79 MMHG | SYSTOLIC BLOOD PRESSURE: 124 MMHG

## 2022-06-03 VITALS — SYSTOLIC BLOOD PRESSURE: 124 MMHG | DIASTOLIC BLOOD PRESSURE: 79 MMHG

## 2022-06-03 RX ADMIN — Medication SCH MG: at 08:24

## 2022-06-03 RX ADMIN — LEVOTHYROXINE SODIUM SCH MCG: 25 TABLET ORAL at 06:13

## 2022-06-03 RX ADMIN — Medication SCH ML: at 08:37

## 2022-06-03 RX ADMIN — Medication SCH TAB: at 08:24

## 2022-06-03 RX ADMIN — Medication SCH ML: at 08:25

## 2022-06-03 RX ADMIN — POLYETHYLENE GLYCOL 3350 SCH GM: 17 POWDER, FOR SOLUTION ORAL at 08:37

## 2022-06-03 RX ADMIN — AMLODIPINE BESYLATE SCH MG: 5 TABLET ORAL at 08:24

## 2022-06-03 RX ADMIN — LORAZEPAM PRN MG: 1 TABLET ORAL at 11:49

## 2022-06-03 RX ADMIN — DIVALPROEX SODIUM SCH MG: 250 TABLET, DELAYED RELEASE ORAL at 08:24

## 2022-06-03 NOTE — NUR
Gps/Lvn- Discharged to [facility]. via ambulance, in no sign of any distress, , denies 
any discomfort, no complaints noted . No belongings , but has 3 bangle bracelets- plastics 
and metal.

## 2022-06-03 NOTE — NUR
Gps/Lvn- Toileted, voided , 2 staff assisting was anxious, difficulty following directions.  
Applied diaper for transportation to [facility]

## 2022-06-03 NOTE — NUR
ANY Discharge Note:



Pt will be discharged to [place] [address] 
([phone]) via Ambulance transportation at 11AM. ANY spoke with admin coordinator, Vivian 
([phone]) at the facility who states they are ready to accept the patient today. Pt is 
aware and agreeable with discharge plans. ANY spoke with pts daughter, Kirsty ([phone]) 
who is aware and agreeable with the discharge plan. Pt is alert and oriented x1(name), is 
unable to plan for self-care at this time; however, is willing to accept care at SNF. Pt 
denies any suicidal or homicidal ideation. Pt will follow-up at the facility with 
Psychiatrist, Dr. Carvalho and Internist, Dr. Martinez. Pt presents with calm mood and 
congruent affect. PHARMACY: [facility] ([phone]) [address] [address].

## 2022-06-03 NOTE — NUR
Gps/Lvn-Called [facility]. report was given to Nursing Supervisor Reyna. Informed pick 
up time arranged at 1100. All belongings/valuables was given back to patient. Daughter Kirsty 
was aware of her discharge plan today, patient was also informed.